# Patient Record
Sex: FEMALE | URBAN - METROPOLITAN AREA
[De-identification: names, ages, dates, MRNs, and addresses within clinical notes are randomized per-mention and may not be internally consistent; named-entity substitution may affect disease eponyms.]

---

## 2017-09-19 PROBLEM — D35.2 PROLACTINOMA (HCC): Chronic | Status: ACTIVE | Noted: 2017-09-19

## 2017-09-19 PROBLEM — E28.2 PCOS (POLYCYSTIC OVARIAN SYNDROME): Chronic | Status: ACTIVE | Noted: 2017-09-19

## 2018-06-18 PROBLEM — M79.606 LEG PAIN: Status: ACTIVE | Noted: 2018-06-18

## 2021-09-17 PROCEDURE — 88305 TISSUE EXAM BY PATHOLOGIST: CPT | Mod: TC,ORL | Performed by: NURSE PRACTITIONER

## 2021-09-17 PROCEDURE — 88305 TISSUE EXAM BY PATHOLOGIST: CPT | Mod: 26 | Performed by: DERMATOLOGY

## 2021-09-20 ENCOUNTER — LAB REQUISITION (OUTPATIENT)
Dept: LAB | Facility: CLINIC | Age: 31
End: 2021-09-20
Payer: COMMERCIAL

## 2021-09-20 DIAGNOSIS — D48.5 NEOPLASM OF UNCERTAIN BEHAVIOR OF SKIN: ICD-10-CM

## 2021-09-22 LAB
PATH REPORT.COMMENTS IMP SPEC: NORMAL
PATH REPORT.FINAL DX SPEC: NORMAL
PATH REPORT.GROSS SPEC: NORMAL
PATH REPORT.MICROSCOPIC SPEC OTHER STN: NORMAL
PATH REPORT.RELEVANT HX SPEC: NORMAL

## 2022-03-18 PROBLEM — M79.606 LEG PAIN: Status: ACTIVE | Noted: 2018-06-18

## 2022-03-19 PROBLEM — E28.2 PCOS (POLYCYSTIC OVARIAN SYNDROME): Status: ACTIVE | Noted: 2017-09-19

## 2022-03-20 PROBLEM — D35.2 PROLACTINOMA (HCC): Status: ACTIVE | Noted: 2017-09-19

## 2023-02-09 LAB — PAP SMEAR, EXTERNAL: NEGATIVE

## 2024-02-07 ENCOUNTER — HOSPITAL ENCOUNTER (OUTPATIENT)
Dept: PHYSICAL THERAPY | Age: 34
Setting detail: RECURRING SERIES
Discharge: HOME | End: 2024-02-10
Payer: COMMERCIAL

## 2024-02-07 DIAGNOSIS — M79.10 MYALGIA: ICD-10-CM

## 2024-02-07 DIAGNOSIS — R27.8 OTHER LACK OF COORDINATION: Primary | ICD-10-CM

## 2024-02-07 PROCEDURE — 97530 THERAPEUTIC ACTIVITIES: CPT

## 2024-02-07 PROCEDURE — 97110 THERAPEUTIC EXERCISES: CPT

## 2024-02-07 PROCEDURE — 97162 PT EVAL MOD COMPLEX 30 MIN: CPT

## 2024-02-07 NOTE — PROGRESS NOTES
Kelly Paris  : 1990  Primary: [facility] (Commercial)  Secondary:  [facility]ly [facility] @ [address] DELGADO Sheng  [place] [address]  Phone: [phone]  Fax: [phone] Plan Frequency: 1x/week  Plan of Care/Certification Expiration Date: 24 (24)        Plan of Care/Certification Expiration Date:  Plan of Care/Certification Expiration Date: 24 (24)    Frequency/Duration: Plan Frequency: 1x/week      Time In/Out:   Time In: 1005  Time Out: 1115      PT Visit Info:         Visit Count:  1    OUTPATIENT PHYSICAL THERAPY:   Treatment Note 2024       Episode  (PFPT)               Treatment Diagnosis:    Other lack of coordination  Myalgia  Contributing Diagnosis:  Pelvic and perineal pain (R10.2) and Pelvic floor dysfunction in female (M62.98)  Medical/Referring Diagnosis:    Pelvic congestion syndrome          Referring Physician:  Ludy Wesley MD MD Orders:  PT Eval and Treat   Return MD Appt:     Date of Onset:  Onset Date: 22 (Approximate)     Allergies:   Amoxicillin-pot clavulanate  Restrictions/Precautions:   None      Interventions Planned (Treatment may consist of any combination of the following):     See Assessment Note    Subjective Comments:   See IE.  Initial Pain Level::    5  /10  Post Session Pain Level:       5 /10  Medications Last Reviewed:  2024  Updated Objective Findings:  See Evaluation Note from today  Treatment   THERAPEUTIC ACTIVITY: ( 25 minutes): Functional activity education regarding anatomy, pathology and role of pelvic floor muscle (PFM) function in relation to presenting symptoms and role of pelvic floor therapy in conservative treatment. and Instruction on coordinated pelvic floor and diaphragmatic breathing to improve kinesthetic awareness of pelvic muscle mobility and restore proper motor recruitment patterns with breathing, posture, and functional movement (e.g. appropriate

## 2024-02-07 NOTE — THERAPY EVALUATION
[] Left  []DNT     Breath assessment:  Observation: chest breathing dominant  Coordination of pelvic floor muscles with breath: paradoxical movement present  Co-contraction of PFM with transversus abdominis: able with cuing       ASSESSMENT   Initial Assessment: Kelly Paris presents with musculoskeletal limitations including pelvic floor muscle (PFM) tension, reduced PFM Range of Motion (ROM), increased tenderness to palpation of the PFM, reduced coordination and awareness of PFM, abdominal soft tissue restrictions, poor diaphragm excursion, poor abdominal strength, and decreased pelvic floor muscle (PFM) strength.  These limitations are impairing the patient's ability to properly coordinate perineal elevation and descent for normalized PFM function, contributing to urinary dysfunction and pelvic pain . As a result, she is limited in her/his ability to participate in household chores and physical activities such as walking, swimming, or other exercise.      Therapy Problem List: (Impacting functional limitations):    Increased Pain, Decreased Strength, Decreased ROM, Decreased Functional Mobility, Decreased Owen with Home Exercise Program, Decreased Posture, Decreased Body Mechanics, Decreased Activity Tolerance/Endurance*, and Decreased Coordination   Therapy Prognosis:   Good     Initial Assessment Complexity:   Moderate Complexity     PLAN   Effective Dates: 2/7/2024 TO Plan of Care/Certification Expiration Date: 05/07/24 (05/07/24)   05/07/24   Frequency/Duration: Plan Frequency: 1x/week     Interventions Planned (Treatment may consist of any combination of the following):    Home Exercise Program (HEP), Manual Therapy, Neuromuscular Re-education/Strengthening, Range of Motion (ROM), Therapeutic Activites, and Therapeutic Exercise/Strengthening     Goals: (Goals have been discussed and agreed upon with patient.)  Short-Term Functional Goals: Time Frame: 6 weeks  Pt will demonstrate I with basic PFM

## 2024-02-22 ENCOUNTER — HOSPITAL ENCOUNTER (OUTPATIENT)
Dept: PHYSICAL THERAPY | Age: 34
Setting detail: RECURRING SERIES
Discharge: HOME | End: 2024-02-25
Payer: COMMERCIAL

## 2024-02-22 PROCEDURE — 97530 THERAPEUTIC ACTIVITIES: CPT

## 2024-02-22 PROCEDURE — 97110 THERAPEUTIC EXERCISES: CPT

## 2024-02-22 PROCEDURE — 97140 MANUAL THERAPY 1/> REGIONS: CPT

## 2024-02-22 NOTE — PROGRESS NOTES
Date:     Activity/Exercise Parameters Parameters Parameters   PFM range of motion/relaxation  - happy baby  - german pose  - DB 2x/day, LE's elevated on wedge 10 min Reviewed DB 2x/day    Supported CP with ball walk out  X 10 * HEP    Seated hip ER -   With support using ex ball  3 x 30 sec  *HEP    Thoracic rotation (side-lying)  X 10  *HEP    Bridge + hip abduction  10 x 10, BTB   *HEP    Side-lying hip abduction against wall  X 20  *HEP         MANUAL THERAPY: (15 minutes):   Soft tissue mobilization was utilized and necessary because of the patient's restricted motion of soft tissue.   Date Type Location Comments   2/22/2024 Internal assessment/treatment Via vaginal canal SP x 30 sec holds, paired with DB applied to iliococcygeus and OI, B     Pelvic diaphragm release SP paired with DB                                 (Used abbreviations: MET - muscle energy technique; SCS- Strain counter strain; CTM-Connective tissue mobilizations; CR- Contract/Relax; SP- Sustained pressure; PIT- Positional inhibition techniques; STM Soft -tissue mobilization; MM- Myofascial mobilization; TrP-Trigger point release; IASTM- Instrument assisted soft tissue mobilizations, TDN-Trigger point dry needling)    Pt gives verbal consent to internal vaginal assessment/treatment without chaperon present.     Treatment/Session Summary:    Treatment Assessment:   Pt showing improved coordination of PFM with her diaphragm. Pt with best lengthening/relaxation at end range of exhalation. She will continue to benefit from manual and verbal cues to facilitate diaphragmatic excursion. Responded best to manual techniques utilizing SP versus glides.   Communication/Consultation:    Equipment provided today:  HEP  Recommendations/Intent for next treatment session: Next visit will focus on PFM down-training.    >Total Treatment Billable Duration:  60 minutes  Time In: 0905  Time Out: 100    Maritza Barahona, PT         Charge Capture  [employer] Portal  Appt

## 2024-02-23 SDOH — HEALTH STABILITY: PHYSICAL HEALTH: ON AVERAGE, HOW MANY MINUTES DO YOU ENGAGE IN EXERCISE AT THIS LEVEL?: 40 MIN

## 2024-02-23 SDOH — HEALTH STABILITY: PHYSICAL HEALTH: ON AVERAGE, HOW MANY DAYS PER WEEK DO YOU ENGAGE IN MODERATE TO STRENUOUS EXERCISE (LIKE A BRISK WALK)?: 6 DAYS

## 2024-02-26 ENCOUNTER — OFFICE VISIT (OUTPATIENT)
Dept: FAMILY MEDICINE CLINIC | Facility: CLINIC | Age: 34
End: 2024-02-26
Payer: COMMERCIAL

## 2024-02-26 VITALS
OXYGEN SATURATION: 100 % | WEIGHT: 159.6 LBS | BODY MASS INDEX: 24.19 KG/M2 | HEART RATE: 71 BPM | HEIGHT: 68 IN | DIASTOLIC BLOOD PRESSURE: 74 MMHG | SYSTOLIC BLOOD PRESSURE: 116 MMHG

## 2024-02-26 DIAGNOSIS — D35.2 PROLACTINOMA (HCC): Primary | ICD-10-CM

## 2024-02-26 DIAGNOSIS — K21.9 GASTROESOPHAGEAL REFLUX DISEASE, UNSPECIFIED WHETHER ESOPHAGITIS PRESENT: ICD-10-CM

## 2024-02-26 DIAGNOSIS — R76.8 POSITIVE ANA (ANTINUCLEAR ANTIBODY): ICD-10-CM

## 2024-02-26 DIAGNOSIS — G44.89 OTHER HEADACHE SYNDROME: ICD-10-CM

## 2024-02-26 PROBLEM — I87.1 MAY-THURNER SYNDROME: Status: ACTIVE | Noted: 2022-03-29

## 2024-02-26 PROBLEM — E23.6 PITUITARY CYST (HCC): Status: ACTIVE | Noted: 2020-03-02

## 2024-02-26 PROBLEM — L68.0 HIRSUTISM: Status: ACTIVE | Noted: 2018-08-06

## 2024-02-26 PROBLEM — I87.1 NUTCRACKER PHENOMENON OF RENAL VEIN: Status: ACTIVE | Noted: 2022-03-29

## 2024-02-26 PROBLEM — Z87.42 HISTORY OF PCOS: Status: ACTIVE | Noted: 2018-08-06

## 2024-02-26 PROBLEM — R10.2 PELVIC PAIN IN FEMALE: Status: ACTIVE | Noted: 2021-11-19

## 2024-02-26 PROCEDURE — G8420 CALC BMI NORM PARAMETERS: HCPCS | Performed by: FAMILY MEDICINE

## 2024-02-26 PROCEDURE — 1036F TOBACCO NON-USER: CPT | Performed by: FAMILY MEDICINE

## 2024-02-26 PROCEDURE — G8427 DOCREV CUR MEDS BY ELIG CLIN: HCPCS | Performed by: FAMILY MEDICINE

## 2024-02-26 PROCEDURE — G8484 FLU IMMUNIZE NO ADMIN: HCPCS | Performed by: FAMILY MEDICINE

## 2024-02-26 PROCEDURE — 99203 OFFICE O/P NEW LOW 30 MIN: CPT | Performed by: FAMILY MEDICINE

## 2024-02-26 SDOH — ECONOMIC STABILITY: HOUSING INSECURITY
IN THE LAST 12 MONTHS, WAS THERE A TIME WHEN YOU DID NOT HAVE A STEADY PLACE TO SLEEP OR SLEPT IN A SHELTER (INCLUDING NOW)?: NO

## 2024-02-26 SDOH — ECONOMIC STABILITY: FOOD INSECURITY: WITHIN THE PAST 12 MONTHS, YOU WORRIED THAT YOUR FOOD WOULD RUN OUT BEFORE YOU GOT MONEY TO BUY MORE.: NEVER TRUE

## 2024-02-26 SDOH — ECONOMIC STABILITY: FOOD INSECURITY: WITHIN THE PAST 12 MONTHS, THE FOOD YOU BOUGHT JUST DIDN'T LAST AND YOU DIDN'T HAVE MONEY TO GET MORE.: NEVER TRUE

## 2024-02-26 SDOH — ECONOMIC STABILITY: INCOME INSECURITY: HOW HARD IS IT FOR YOU TO PAY FOR THE VERY BASICS LIKE FOOD, HOUSING, MEDICAL CARE, AND HEATING?: NOT HARD AT ALL

## 2024-02-26 ASSESSMENT — PATIENT HEALTH QUESTIONNAIRE - PHQ9
1. LITTLE INTEREST OR PLEASURE IN DOING THINGS: 0
SUM OF ALL RESPONSES TO PHQ QUESTIONS 1-9: 0
2. FEELING DOWN, DEPRESSED OR HOPELESS: 0
SUM OF ALL RESPONSES TO PHQ QUESTIONS 1-9: 0
SUM OF ALL RESPONSES TO PHQ QUESTIONS 1-9: 0
SUM OF ALL RESPONSES TO PHQ9 QUESTIONS 1 & 2: 0
SUM OF ALL RESPONSES TO PHQ QUESTIONS 1-9: 0

## 2024-02-26 ASSESSMENT — ENCOUNTER SYMPTOMS: RESPIRATORY NEGATIVE: 1

## 2024-02-26 NOTE — PROGRESS NOTES
PROGRESS NOTE    SUBJECTIVE:   Kelly Paris is a 34 y.o. female seen for a follow up visit regarding   Chief Complaint   Patient presents with    New Patient     Establish care        HPI:  Hx of GERD  Hx of esophageal stric  Does not see a GI  New patient, here to establish care.  She has a history of prolactinoma, diagnosed in 2014, at that time she had a 4 mm lesion noted on her pituitary gland.  Will start her on cabergoline, which was interrupted during her pregnancy and breast-feeding.  Resumed taking this after breast-feeding discontinued.  Follow-up MRI 2018 showed no evidence of a microadenoma.  She continues to take cabergoline twice a week.  She has not seen endocrinologist here.  She reports history of positive ZHANG, was seen rheumatology here and renal, apparently there has been some suspicion of scleroderma.  He has had some salivary issues, 1 time she has some difficulty swallowing, had to have esophageal dilation, however she does have a history of GERD.  She does not have any other intervention, presently no issues with swallowing.  Most recent ZHANG that I can see was 1:320.           Reviewed and updated this visit by provider:           Review of Systems   Respiratory: Negative.     Cardiovascular: Negative.           OBJECTIVE:  Vitals:    02/26/24 1023   BP: 116/74   Site: Left Upper Arm   Cuff Size: Small Adult   Pulse: 71   SpO2: 100%   Weight: 72.4 kg (159 lb 9.6 oz)   Height: 1.721 m (5' 7.75\")        Physical Exam   General: Alert and oriented x3, well-appearing  Neck: No adenopathy, thyromegaly or thyroid nodules  Pulmonary: Normal effort, good airflow, no rales or rhonchi  CVS: Regular rate and rhythm, normal S1, S2, no S3 or S4, no murmurs; no carotid bruits, 2+ pedal pulses      Medical problems and test results were reviewed with the patient today.     No results found for this or any previous visit (from the past 672 hour(s)).    No results found for any visits on 02/26/24.

## 2024-02-28 ENCOUNTER — HOSPITAL ENCOUNTER (OUTPATIENT)
Dept: PHYSICAL THERAPY | Age: 34
Setting detail: RECURRING SERIES
Discharge: HOME | End: 2024-03-02
Payer: COMMERCIAL

## 2024-02-28 PROCEDURE — 97110 THERAPEUTIC EXERCISES: CPT

## 2024-02-28 PROCEDURE — 97530 THERAPEUTIC ACTIVITIES: CPT

## 2024-02-28 PROCEDURE — 97140 MANUAL THERAPY 1/> REGIONS: CPT

## 2024-02-28 NOTE — PROGRESS NOTES
is showing improved coordination of DB, but descent remains limited. Excessive lumbar extension noted with completion of quadruped hip extension and UE flexion.   Communication/Consultation:    Equipment provided today:  HEP  Recommendations/Intent for next treatment session: Next visit will focus on PFM down-training and coordination training, global core and hip stabilization    >Total Treatment Billable Duration:  46 minutes  Time In: 0910  Time Out: 1000    Maritza Barahona PT         Charge Capture  [employer] Portal  Appt Desk     Future Appointments   Date Time Provider Department Center   3/6/2024 10:00 AM Maritza Barahona PT [facility]   3/12/2024 10:00 AM Maritza Barahona PT [facility]   3/25/2024 10:00 AM Maritza Barahona PT [facility]   4/1/2024 10:00 AM Maritza Barahona PT [facility]

## 2024-03-06 ENCOUNTER — HOSPITAL ENCOUNTER (OUTPATIENT)
Dept: PHYSICAL THERAPY | Age: 34
Setting detail: RECURRING SERIES
End: 2024-03-06
Payer: COMMERCIAL

## 2024-03-06 NOTE — PROGRESS NOTES
Kelly Paris  : 1990  Primary: [facility]  Secondary:  [facility] @ [address] DELGADO Sheng  [facility] [address]  Phone: [phone]  Fax: [phone]    PT Visit Info:    No data recorded   OT Visit Info:  No data recorded    OUTPATIENT THERAPY: 3/6/2024  Episode  Appt Desk        Kelly Paris cancelled her appointment for today due to unknown reasons.  Will plan to follow up next during next appointment.  Thank you,  Maritza Barahona, PT    Future Appointments   Date Time Provider Department Center   3/6/2024  7:00 PM Maritza Barahona, PT [facility]   3/12/2024 10:00 AM Maritza Barahona, PT [facility]   3/25/2024 10:00 AM Maritza Barahona, PT [facility]   2024 10:00 AM Maritza Barahona, PT [facility]   2024 10:00 AM Umang Valverde MD [facility]   2024 10:00 AM Royal Hartley DO [facility]

## 2024-03-12 ENCOUNTER — HOSPITAL ENCOUNTER (OUTPATIENT)
Dept: PHYSICAL THERAPY | Age: 34
Setting detail: RECURRING SERIES
Discharge: HOME | End: 2024-03-15
Payer: COMMERCIAL

## 2024-03-12 PROCEDURE — 97110 THERAPEUTIC EXERCISES: CPT

## 2024-03-12 NOTE — PROGRESS NOTES
Kelly Paris  : 1990  Primary: [facility] (Commercial)  Secondary:  [facility] @ [address] [place] [place] [address]  Phone: [phone]  Fax: [phone] Plan Frequency: 1x/week  Plan of Care/Certification Expiration Date: 24 (24)        Plan of Care/Certification Expiration Date:  Plan of Care/Certification Expiration Date: 24 (24)    Frequency/Duration: Plan Frequency: 1x/week      Time In/Out:   Time In: 1005  Time Out: 1100      PT Visit Info:         Visit Count:  4    OUTPATIENT PHYSICAL THERAPY:   Treatment Note 3/12/2024       Episode  (PFPT)               Treatment Diagnosis:    Other lack of coordination  Myalgia  Contributing Diagnosis:  Pelvic and perineal pain (R10.2) and Pelvic floor dysfunction in female (M62.98)  Medical/Referring Diagnosis:    Pelvic congestion syndrome      Referring Physician:  Ludy Wesley MD MD Orders:  PT Eval and Treat   Return MD Appt:     Date of Onset:  Onset Date: 22 (Approximate)     Allergies:   Amoxicillin-pot clavulanate  Restrictions/Precautions:   None      Interventions Planned (Treatment may consist of any combination of the following):     See Assessment Note    Subjective Comments:   3/12/24:  Pt currently on her menses. She defers internal treatment.   Pt states she not been as consistent with her exercises due to cervical pain. She saw chiropractic care and has had relief in her cervical spine.   Pt reports her pelvic pain has been less. Cramping has been well managed currently. Typically she reports greater discomfort at the end of her cycle.   Reduced discomfort during sexual intercourse.   Urinary urge sensations have not been painful.     24:  No cramping following previous session. Overall, pelvic pain has been well managed in the last week.   Feels it has been better.     24:  Pt went on vacation to [place] and did well in regards to

## 2024-03-25 ENCOUNTER — HOSPITAL ENCOUNTER (OUTPATIENT)
Dept: PHYSICAL THERAPY | Age: 34
Setting detail: RECURRING SERIES
Discharge: HOME | End: 2024-03-28
Payer: COMMERCIAL

## 2024-03-25 PROCEDURE — 97110 THERAPEUTIC EXERCISES: CPT

## 2024-03-25 PROCEDURE — 97140 MANUAL THERAPY 1/> REGIONS: CPT

## 2024-03-25 ASSESSMENT — PAIN SCALES - GENERAL: PAINLEVEL_OUTOF10: 6

## 2024-03-25 NOTE — PROGRESS NOTES
Kelly Paris  : 1990  Primary: [facility] (Commercial)  Secondary:  [facility] @ [address] [place]GAD[place] Sheng  [facility] [address]  Phone: [phone]  Fax: [phone] Plan Frequency: 1x/week  Plan of Care/Certification Expiration Date: 24 (24)        Plan of Care/Certification Expiration Date:  Plan of Care/Certification Expiration Date: 24 (24)    Frequency/Duration: Plan Frequency: 1x/week      Time In/Out:   Time In: 1010  Time Out: 1115      PT Visit Info:         Visit Count:  5    OUTPATIENT PHYSICAL THERAPY:   Treatment Note 3/25/2024       Episode  (PFPT)               Treatment Diagnosis:    Other lack of coordination  Myalgia  Contributing Diagnosis:  Pelvic and perineal pain (R10.2) and Pelvic floor dysfunction in female (M62.98)  Medical/Referring Diagnosis:    Pelvic congestion syndrome      Referring Physician:  Ludy Wesley MD MD Orders:  PT Eval and Treat   Return MD Appt:     Date of Onset:  Onset Date: 22 (Approximate)     Allergies:   Amoxicillin-pot clavulanate  Restrictions/Precautions:   None      Interventions Planned (Treatment may consist of any combination of the following):     See Assessment Note    Subjective Comments:   3/25/24:  Pt has been completed her exercises without resistance bands.   Pt feels bridges may be causing more pain in her pelvic muscles.   Pt holding bridges for longer period of time typically. >10 seconds.   No pain with additional exercises.   Increased pain following picking up sticks. Pain is described as deep and internal.   Denies LBP.   Denies abdominal cramping.   No difficulty with bowel/bladder.     3/12/24:  Pt currently on her menses. She defers internal treatment.   Pt states she not been as consistent with her exercises due to cervical pain. She saw chiropractic care and has had relief in her cervical spine.   Pt reports her pelvic pain has been less.

## 2024-04-01 ENCOUNTER — APPOINTMENT (OUTPATIENT)
Dept: PHYSICAL THERAPY | Age: 34
End: 2024-04-01
Payer: COMMERCIAL

## 2024-04-09 ENCOUNTER — OFFICE VISIT (OUTPATIENT)
Dept: ENDOCRINOLOGY | Age: 34
End: 2024-04-09
Payer: COMMERCIAL

## 2024-04-09 VITALS
OXYGEN SATURATION: 97 % | BODY MASS INDEX: 24.05 KG/M2 | WEIGHT: 157 LBS | HEART RATE: 73 BPM | DIASTOLIC BLOOD PRESSURE: 75 MMHG | SYSTOLIC BLOOD PRESSURE: 115 MMHG

## 2024-04-09 DIAGNOSIS — D35.2 PROLACTINOMA (HCC): Primary | ICD-10-CM

## 2024-04-09 DIAGNOSIS — E28.2 POLYCYSTIC OVARIAN SYNDROME: ICD-10-CM

## 2024-04-09 DIAGNOSIS — D35.2 PROLACTINOMA (HCC): ICD-10-CM

## 2024-04-09 LAB — PROLACTIN SERPL-MCNC: 30.7 NG/ML

## 2024-04-09 PROCEDURE — G8420 CALC BMI NORM PARAMETERS: HCPCS | Performed by: INTERNAL MEDICINE

## 2024-04-09 PROCEDURE — 99204 OFFICE O/P NEW MOD 45 MIN: CPT | Performed by: INTERNAL MEDICINE

## 2024-04-09 PROCEDURE — 1036F TOBACCO NON-USER: CPT | Performed by: INTERNAL MEDICINE

## 2024-04-09 PROCEDURE — G8427 DOCREV CUR MEDS BY ELIG CLIN: HCPCS | Performed by: INTERNAL MEDICINE

## 2024-04-09 RX ORDER — CABERGOLINE 0.5 MG/1
0.25 TABLET ORAL
Qty: 4 TABLET | Refills: 1
Start: 2024-04-11 | End: 2024-04-10 | Stop reason: SDUPTHER

## 2024-04-09 ASSESSMENT — ENCOUNTER SYMPTOMS
CONSTIPATION: 0
DIARRHEA: 0

## 2024-04-10 DIAGNOSIS — D35.2 PROLACTINOMA (HCC): ICD-10-CM

## 2024-04-10 RX ORDER — CABERGOLINE 0.5 MG/1
0.25 TABLET ORAL WEEKLY
Qty: 2 TABLET | Refills: 5 | Status: SHIPPED | OUTPATIENT
Start: 2024-04-10

## 2024-04-10 NOTE — PROGRESS NOTES
My Chart message to patient:    Thank you for checking labs.  Your prolactin level is approximately 30, which is not as high as it has been in the past.  Go ahead and reduce your cabergoline dose to half of a pill once a week rather than twice a week.  I sent the prescription to your local pharmacy on file.  If you have any return of breast milk production, or if your menstrual cycles stop, please let me know.  Otherwise, just recheck labs a couple of days before the next appointment with me.

## 2024-04-12 ENCOUNTER — HOSPITAL ENCOUNTER (OUTPATIENT)
Dept: PHYSICAL THERAPY | Age: 34
Setting detail: RECURRING SERIES
Discharge: HOME | End: 2024-04-15
Payer: COMMERCIAL

## 2024-04-12 PROCEDURE — 97140 MANUAL THERAPY 1/> REGIONS: CPT

## 2024-04-12 PROCEDURE — 97110 THERAPEUTIC EXERCISES: CPT

## 2024-04-12 NOTE — PROGRESS NOTES
Date:  4/12/24:   Activity/Exercise Parameters Parameters Parameters   PFM range of motion/relaxation  - happy baby  - german pose  - DB 2x/day, LE's elevated on wedge      Supported CP with ball walk out CP x 2 min     Seated hip ER -  Seated w/out exercise ball 3 x 30 sec     Thoracic rotation (side-lying) held     Bridge + hip abduction 10 x 10 BTB X 10 x 10 BTB    Side-lying hip abduction against wall 3 x 10, YTB     Bird dog X 10     X 5 sec UE flexion, LE extension, x 5 sec shoulder abd, hip abd     Thoracic mobility      Management of cervical pain      Tall kneeling - pectoral and hip flexor stretch in doorway 4 x 30 sec Reviewed positioning    Lateral lunge X 10 - emphasis on adductor mobility  X 5 - emphasis on adductor mobility   *reviewed mechanics and form Held   HEP review and update  5 min Reviewed and updated Thorough review of HEP   Reverse lunge   X 5, B, mirror for visual cues   Squat   X 10, B, mirror for visual cues  To chair   Hip ER mobility options   Supine, against wall, seated   Variations of HS stretch   Seated, standing, against wall      Access Code: [email or web address]  URL: [email or web address]  Date: 03/12/2024  Prepared by: Maritza Barahona    Exercises  - Sidelying Hip Abduction with Resistance at Thighs  - 1 x daily - 3 x weekly - 3 sets - 10 reps  - Bridge with Hip Abduction and Resistance  - 1 x daily - 3 x weekly - 3 sets - 10 reps  - Bird Dog  - 1 x daily - 3 x weekly - 2 sets - 10 reps  - Doorway Kneeling Hip Flexor Stretch  - 1 x daily - 7 x weekly - 3 sets - 30 hold  - Lateral Lunge  - 1 x daily - 3 x weekly - 2 sets - 10 reps  - Supine Pelvic Floor Stretch - Hands on Knees  - 1 x daily - 7 x weekly - 3 sets - 30 hold  - Hip External Rotation Stretch  - 1 x daily - 7 x weekly - 3 sets - 30 hold      MANUAL THERAPY: (16 minutes):   Soft tissue mobilization was utilized and necessary because of the patient's restricted motion of soft tissue.   Date Type Location Comments

## 2024-05-03 ENCOUNTER — HOSPITAL ENCOUNTER (OUTPATIENT)
Dept: PHYSICAL THERAPY | Age: 34
Setting detail: RECURRING SERIES
End: 2024-05-03
Payer: COMMERCIAL

## 2024-05-03 NOTE — PROGRESS NOTES
Kelly Paris  : 1990  Primary: [facility]  Secondary:  [facility] @ [address]GAD[address] Sheng  [facility] [address]  Phone: [phone]  Fax: [phone]    PT Visit Info:    No data recorded   OT Visit Info:  No data recorded    OUTPATIENT THERAPY: 5/3/2024  Episode  Appt Desk        Kelly Paris cancelled her appointment for today due to unknown reasons.  Will plan to follow up next during next appointment.  Thank you,  Maritza Barahona PT    Future Appointments   Date Time Provider Department Center   5/3/2024  7:00 PM Maritza Barahona PT [facility]   2024 10:00 AM Royal Hartley DO [facility]   10/10/2024 10:45 AM Umang Valverde MD [facility]

## 2024-05-15 ENCOUNTER — HOSPITAL ENCOUNTER (OUTPATIENT)
Dept: PHYSICAL THERAPY | Age: 34
Setting detail: RECURRING SERIES
Discharge: HOME | End: 2024-05-18
Payer: COMMERCIAL

## 2024-05-15 PROCEDURE — 97110 THERAPEUTIC EXERCISES: CPT

## 2024-05-15 ASSESSMENT — PAIN SCALES - GENERAL: PAINLEVEL_OUTOF10: 0

## 2024-05-15 NOTE — THERAPY RECERTIFICATION
Kelly Paris  : 1990  Primary: [facility] (Commercial)  Secondary:  [facility] @ [address] Sheng  [facility] [address]  Phone: [phone]  Fax: [phone] Plan Frequency: bi-weekly    Plan of Care/Certification Expiration Date: 24 (24)        Plan of Care/Certification Expiration Date:  Plan of Care/Certification Expiration Date: 24 (24)    Frequency/Duration: Plan Frequency: bi-weekly      Time In/Out:   Time In: 1102  Time Out: 1159    PT Visit Info:    Plan Frequency: bi-weekly      Visit Count:  7                OUTPATIENT PHYSICAL THERAPY:             Recertification 5/15/2024                 Episode (PFPT)         Treatment Diagnosis:    Other lack of coordination  Myalgia  Contributing Diagnosis:  Pelvic and perineal pain (R10.2) and Pelvic floor dysfunction in female (M62.98)  Medical/Referring Diagnosis:    Pelvic congestion syndrome      Referring Physician:  Ludy Wesley MD MD Orders:  PT Eval and Treat   Return MD Appt:    Date of Onset:  Onset Date: 22 (Approximate)     Allergies:  Amoxicillin-pot clavulanate  Restrictions/Precautions:    None      Medications Last Reviewed:  5/15/2024     SUBJECTIVE   Subjective at Re-Certification/Re-Evaluation:  5/15/24:  She would like to just complete exercises today.   Pt describes pain is overall less. Her exercises are helping.   She had intercourse with her  and felt her pain was less.   Urinary: Denies issues.   Denies issues with BM's.   Pt states she took a couple weeks off of completing her exercises. During the past week she has completed her exercises daily.   Feels some knee pain with squats.   *Pt will see rheumatology on Friday.  PPD: 0    History of Injury/Illness (Reason for Referral):  Ms. Paris is a 35 yo female  referred to pelvic floor physical therapy (PFPT) by Ludy Wesley MD 2/ due to pelvic pain and  Pelvic

## 2024-05-15 NOTE — PROGRESS NOTES
Kelly Paris  : 1990  Primary: [facility] (Commercial)  Secondary:  [facility] @ [address]GADO Sheng  [facility] [address]  Phone: [phone]  Fax: [phone] Plan Frequency: 1x/week  Plan of Care/Certification Expiration Date: 24 (24)        Plan of Care/Certification Expiration Date:  Plan of Care/Certification Expiration Date: 24 (24)    Frequency/Duration: Plan Frequency: 1x/week      Time In/Out:   Time In: 1102  Time Out: 1159      PT Visit Info:         Visit Count:  7    OUTPATIENT PHYSICAL THERAPY:   Treatment Note 5/15/2024       Episode  (PFPT)               Treatment Diagnosis:    Other lack of coordination  Myalgia  Contributing Diagnosis:  Pelvic and perineal pain (R10.2) and Pelvic floor dysfunction in female (M62.98)  Medical/Referring Diagnosis:    Pelvic congestion syndrome      Referring Physician:  Ludy Wesley MD MD Orders:  PT Eval and Treat   Return MD Appt:     Date of Onset:  Onset Date: 22 (Approximate)     Allergies:   Amoxicillin-pot clavulanate  Restrictions/Precautions:   None      Interventions Planned (Treatment may consist of any combination of the following):     See Assessment Note    Subjective Comments:   5/15/24:  She would like to just complete exercises today.   Pt describes pain is overall less. Her exercises are helping.   She had intercourse with her  and felt her pain was less.   Urinary: Denies issues.   Denies issues with BM's.   Pt states she took a couple weeks off of completing her exercises. During the past week she has completed her exercises daily.   Feels some knee pain with squats.   *Pt will see rheumatology on Friday.    24:  Pt states overall she has been feeling good.   Pt continues to gain relief with headaches with chiropractic care.   Pt does not like using bad due to tension at knees. Placement of band is uncomfortable.   Pt feels her

## 2024-05-17 ENCOUNTER — OFFICE VISIT (OUTPATIENT)
Dept: RHEUMATOLOGY | Age: 34
End: 2024-05-17
Payer: COMMERCIAL

## 2024-05-17 VITALS
WEIGHT: 158 LBS | SYSTOLIC BLOOD PRESSURE: 133 MMHG | HEIGHT: 68 IN | TEMPERATURE: 98.1 F | DIASTOLIC BLOOD PRESSURE: 78 MMHG | BODY MASS INDEX: 23.95 KG/M2 | HEART RATE: 74 BPM

## 2024-05-17 DIAGNOSIS — M25.50 ARTHRALGIA, UNSPECIFIED JOINT: ICD-10-CM

## 2024-05-17 DIAGNOSIS — R76.8 POSITIVE ANA (ANTINUCLEAR ANTIBODY): Primary | ICD-10-CM

## 2024-05-17 PROCEDURE — 99215 OFFICE O/P EST HI 40 MIN: CPT | Performed by: INTERNAL MEDICINE

## 2024-05-17 PROCEDURE — G8420 CALC BMI NORM PARAMETERS: HCPCS | Performed by: INTERNAL MEDICINE

## 2024-05-17 PROCEDURE — 1036F TOBACCO NON-USER: CPT | Performed by: INTERNAL MEDICINE

## 2024-05-17 PROCEDURE — G8427 DOCREV CUR MEDS BY ELIG CLIN: HCPCS | Performed by: INTERNAL MEDICINE

## 2024-05-17 NOTE — PROGRESS NOTES
rotation.     No tenderness of the C spine and no tenderness of the para spinal muscles of the neck. No tenderness of the T and L spine. No SI joint tenderness. No mid joint line tenderness of the knees with no swelling, warmth or redness. No tenderness with synovitis in the ankles with no warmth or redness. No metatarsalgia with no synovitis of the 1st to 5th MTP joints of the feet with no warmth or redness.       Patient otherwise has a normal joint exam without other evidence of joint tenderness, synovitis, warmth, erythema, decreased ROM, weakness or deformities.       Radiology Reports Reviewed (if available):  Last 3 months  [unfilled]    Lab Reports Reviewed (if available): Last 3 months    No results found for this visit on 05/17/24.      The results above were reviewed and discussed with patient.        Assessment and plan:   Kelly Paris is a 34 y.o. female who presents with:     1. Positive ZHANG (antinuclear antibody)  Overview:  9/2023  +ZHANG 1:320 speckled  (-)dsdna, RNP, smith, scl-70, ssa, ssb, chromatin, otto-1, centromere b  Normal ESR, CMP, CBC    3/2022  ZHANG 1:80  (-) ccp, RF, dsdna, smith, rnp, chromatin, ssa, ssb, scl-70, otto-1  Normal ESR    Has raynauds, normal nail fold capillaries. Reports having burning and mild stiffness in the hands/feet, no synovitis on exam. No dry eyes/dry mouth. Discussed that the positive ZHANG might not be clinically significant. There is no other evidence of positive disease specific antibodies or clinical manifestations concerning for CTD or inflammatory arthritis. Patient requested lyme disease to be tested because of grass exposure although no tick bite or EM rash was noted, discussed this is unlikely contributing to symptoms but tested at patient's request.     Plan:  -check labs below  -check x-rays below  -recent labs done by Dr. Lawrence, patient's prior rheumatologist will be sent by the patient to review  -advised her to watch for symptoms of persistent

## 2024-05-20 PROBLEM — M25.50 ARTHRALGIA: Status: ACTIVE | Noted: 2024-05-20

## 2024-07-08 ENCOUNTER — HOSPITAL ENCOUNTER (OUTPATIENT)
Dept: GENERAL RADIOLOGY | Age: 34
Discharge: HOME | End: 2024-07-11
Payer: COMMERCIAL

## 2024-07-08 DIAGNOSIS — R76.8 POSITIVE ANA (ANTINUCLEAR ANTIBODY): ICD-10-CM

## 2024-07-08 DIAGNOSIS — M25.50 ARTHRALGIA, UNSPECIFIED JOINT: ICD-10-CM

## 2024-07-08 LAB
ALBUMIN SERPL-MCNC: 4.4 G/DL (ref 3.5–5)
ALBUMIN/GLOB SERPL: 1.7 (ref 1–1.9)
ALP SERPL-CCNC: 51 U/L (ref 35–104)
ALT SERPL-CCNC: 15 U/L (ref 12–65)
ANION GAP SERPL CALC-SCNC: 11 MMOL/L (ref 9–18)
APPEARANCE UR: CLEAR
AST SERPL-CCNC: 20 U/L (ref 15–37)
BASOPHILS # BLD: 0 K/UL (ref 0–0.2)
BASOPHILS NFR BLD: 1 % (ref 0–2)
BILIRUB SERPL-MCNC: 0.8 MG/DL (ref 0–1.2)
BILIRUB UR QL: NEGATIVE
BUN SERPL-MCNC: 14 MG/DL (ref 6–23)
CALCIUM SERPL-MCNC: 9.6 MG/DL (ref 8.8–10.2)
CHLORIDE SERPL-SCNC: 102 MMOL/L (ref 98–107)
CO2 SERPL-SCNC: 25 MMOL/L (ref 20–28)
COLOR UR: NORMAL
CREAT SERPL-MCNC: 0.74 MG/DL (ref 0.6–1.1)
CREAT UR-MCNC: 37.4 MG/DL (ref 28–217)
DIFFERENTIAL METHOD BLD: NORMAL
EOSINOPHIL # BLD: 0.1 K/UL (ref 0–0.8)
EOSINOPHIL NFR BLD: 2 % (ref 0.5–7.8)
ERYTHROCYTE [DISTWIDTH] IN BLOOD BY AUTOMATED COUNT: 13.3 % (ref 11.9–14.6)
ERYTHROCYTE [SEDIMENTATION RATE] IN BLOOD: <1 MM/HR (ref 0–20)
GLOBULIN SER CALC-MCNC: 2.6 G/DL (ref 2.3–3.5)
GLUCOSE SERPL-MCNC: 80 MG/DL (ref 70–99)
GLUCOSE UR STRIP.AUTO-MCNC: NEGATIVE MG/DL
HCT VFR BLD AUTO: 40.7 % (ref 35.8–46.3)
HGB BLD-MCNC: 12.9 G/DL (ref 11.7–15.4)
HGB UR QL STRIP: NEGATIVE
IMM GRANULOCYTES # BLD AUTO: 0 K/UL (ref 0–0.5)
IMM GRANULOCYTES NFR BLD AUTO: 0 % (ref 0–5)
KETONES UR QL STRIP.AUTO: NEGATIVE MG/DL
LEUKOCYTE ESTERASE UR QL STRIP.AUTO: NEGATIVE
LYMPHOCYTES # BLD: 1.5 K/UL (ref 0.5–4.6)
LYMPHOCYTES NFR BLD: 27 % (ref 13–44)
MCH RBC QN AUTO: 29.6 PG (ref 26.1–32.9)
MCHC RBC AUTO-ENTMCNC: 31.7 G/DL (ref 31.4–35)
MCV RBC AUTO: 93.3 FL (ref 82–102)
MONOCYTES # BLD: 0.6 K/UL (ref 0.1–1.3)
MONOCYTES NFR BLD: 10 % (ref 4–12)
NEUTS SEG # BLD: 3.5 K/UL (ref 1.7–8.2)
NEUTS SEG NFR BLD: 60 % (ref 43–78)
NITRITE UR QL STRIP.AUTO: NEGATIVE
NRBC # BLD: 0 K/UL (ref 0–0.2)
PH UR STRIP: 7 (ref 5–9)
PLATELET # BLD AUTO: 259 K/UL (ref 150–450)
PMV BLD AUTO: 10.4 FL (ref 9.4–12.3)
POTASSIUM SERPL-SCNC: 4 MMOL/L (ref 3.5–5.1)
PROT SERPL-MCNC: 6.9 G/DL (ref 6.3–8.2)
PROT UR STRIP-MCNC: NEGATIVE MG/DL
PROT UR-MCNC: <6 MG/DL
PROT/CREAT UR-RTO: NORMAL
RBC # BLD AUTO: 4.36 M/UL (ref 4.05–5.2)
SODIUM SERPL-SCNC: 138 MMOL/L (ref 136–145)
SP GR UR REFRACTOMETRY: 1.01 (ref 1–1.02)
UROBILINOGEN UR QL STRIP.AUTO: 0.2 EU/DL (ref 0.2–1)
WBC # BLD AUTO: 5.8 K/UL (ref 4.3–11.1)

## 2024-07-08 PROCEDURE — 73630 X-RAY EXAM OF FOOT: CPT

## 2024-07-08 PROCEDURE — 73130 X-RAY EXAM OF HAND: CPT

## 2024-07-09 LAB
C3 SERPL-MCNC: 109 MG/DL (ref 82–167)
C4 SERPL-MCNC: 17 MG/DL (ref 12–38)
CRP SERPL-MCNC: <1 MG/L (ref 0–10)
DSDNA CRITHIDIA LUCILIAE: NEGATIVE
LYME ANTIBODY: NEGATIVE

## 2024-07-10 LAB — THYROPEROXIDASE AB SERPL-ACNC: <9 IU/ML (ref 0–34)

## 2024-08-05 ENCOUNTER — OFFICE VISIT (OUTPATIENT)
Dept: RHEUMATOLOGY | Age: 34
End: 2024-08-05
Payer: COMMERCIAL

## 2024-08-05 VITALS
WEIGHT: 159 LBS | DIASTOLIC BLOOD PRESSURE: 75 MMHG | HEART RATE: 70 BPM | BODY MASS INDEX: 24.1 KG/M2 | TEMPERATURE: 97.9 F | HEIGHT: 68 IN | SYSTOLIC BLOOD PRESSURE: 124 MMHG

## 2024-08-05 DIAGNOSIS — R76.8 SS-B ANTIBODY POSITIVE: ICD-10-CM

## 2024-08-05 DIAGNOSIS — M25.50 ARTHRALGIA, UNSPECIFIED JOINT: ICD-10-CM

## 2024-08-05 DIAGNOSIS — R76.8 POSITIVE ANA (ANTINUCLEAR ANTIBODY): Primary | ICD-10-CM

## 2024-08-05 PROCEDURE — 1036F TOBACCO NON-USER: CPT | Performed by: INTERNAL MEDICINE

## 2024-08-05 PROCEDURE — G8427 DOCREV CUR MEDS BY ELIG CLIN: HCPCS | Performed by: INTERNAL MEDICINE

## 2024-08-05 PROCEDURE — G8420 CALC BMI NORM PARAMETERS: HCPCS | Performed by: INTERNAL MEDICINE

## 2024-08-05 PROCEDURE — 99214 OFFICE O/P EST MOD 30 MIN: CPT | Performed by: INTERNAL MEDICINE

## 2024-08-05 ASSESSMENT — ROUTINE ASSESSMENT OF PATIENT INDEX DATA (RAPID3)
ON A SCALE OF ONE TO TEN, HOW MUCH OF A PROBLEM HAS UNUSUAL FATIGUE OR TIREDNESS BEEN FOR YOU OVER THE PAST WEEK?: 0
ON A SCALE OF ONE TO TEN, CONSIDERING ALL THE WAYS IN WHICH ILLNESS AND HEALTH CONDITIONS MAY AFFECT YOU AT THIS TIME, PLEASE INDICATE BELOW HOW YOU ARE DOING:: 0
ON A SCALE OF ONE TO TEN, HOW DIFFICULT WAS IT FOR YOU TO COMPLETE THE LISTED DAILY PHYSICAL TASKS OVER THE LAST WEEK: 0
ON A SCALE OF ONE TO TEN, HOW MUCH PAIN HAVE YOU HAD BECAUSE OF YOUR CONDITION OVER THE PAST WEEK?: 0

## 2024-08-05 NOTE — PROGRESS NOTES
[facility]  Royal Hartley D.O.  [address], [address] , [address]   [place]  Office : [phone], Fax: [phone]     RHEUMATOLOGY OFFICE VISIT NOTE  Date of Visit:  2024 11:01 AM    Patient Information:  Name:  Kelly Paris  :  1990  Age:  34 y.o.   Gender:  female      Ms. Paris is here today for follow-up of  +ZHANG 1:320 speckled, equivocal SSB  Polyarticular arthralgias    Last visit: 2024 seen In consult for above. [place] ZHANG may not be clinically significant.     Further workup:  2024  (-) dsdna crithidia, tpo ab, lyme ab  Normal CBC, CMP, UA, UPCR, ESR, CRP, C3, C4      History of Present Illness:    Since the last visit, patient is feeling \"very good\".      Pain level: 0 /10    Reviewed prior lab off patient's phone  2024  SSB 7.6 equivocal     Dry mouth - resolved  Raynauds - pale (not starkly obvious pallor) and red with cold exposure, hands are cold.   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 and overdoing it  Mouth sores -not getting these right now but sometimes gets sore in the nose and around when she gets her cycle, they feel tender for a few days         2024    10:00 AM   DMARD/Biologic   AM Stiffness None   Pain 0   Fatigue 0   MDHAQ 0   Patient Global Score 0         REVIEW OF SYSTEMS: The following systems were reviewed with patient today and were negative except for the following (depicted with an \"X\"):        \"X\" General  \"X\" Head and Neck  \"X\" Heart and Breathing  \"X\" Gastrointestinal    Fever/chills   Hair loss   Shortness of breath   Upset stomach    Falls   Dry mouth   Coughing   Diarrhea / constipation    Wt loss   Mouth sores   Wheezing  x Heartburn    Wt gain   Ringing ears   Chest pain   Dark or bloody stools    Night sweats   Diff. swallowing  X None of above   Nausea or vomiting   X None of above  X None of above      None of above                \"X\"

## 2024-08-05 NOTE — PATIENT INSTRUCTIONS
Get labs done on 8/1/2025  F/u in 1 year  Please notify me for any joint swelling/stiffness/restricted ROM, dry eyes, dry mouth, rashes that develop

## 2024-10-09 DIAGNOSIS — D35.2 PROLACTINOMA (HCC): ICD-10-CM

## 2024-10-09 LAB — PROLACTIN SERPL-MCNC: 87.4 NG/ML (ref 4.8–23.3)

## 2024-10-10 ENCOUNTER — OFFICE VISIT (OUTPATIENT)
Dept: ENDOCRINOLOGY | Age: 34
End: 2024-10-10
Payer: COMMERCIAL

## 2024-10-10 DIAGNOSIS — D35.2 PROLACTINOMA (HCC): Primary | ICD-10-CM

## 2024-10-10 PROCEDURE — G8420 CALC BMI NORM PARAMETERS: HCPCS | Performed by: INTERNAL MEDICINE

## 2024-10-10 PROCEDURE — G8428 CUR MEDS NOT DOCUMENT: HCPCS | Performed by: INTERNAL MEDICINE

## 2024-10-10 PROCEDURE — 1036F TOBACCO NON-USER: CPT | Performed by: INTERNAL MEDICINE

## 2024-10-10 PROCEDURE — G8484 FLU IMMUNIZE NO ADMIN: HCPCS | Performed by: INTERNAL MEDICINE

## 2024-10-10 PROCEDURE — 99213 OFFICE O/P EST LOW 20 MIN: CPT | Performed by: INTERNAL MEDICINE

## 2024-10-10 RX ORDER — CABERGOLINE 0.5 MG/1
0.25 TABLET ORAL WEEKLY
Qty: 6 TABLET | Refills: 3 | Status: SHIPPED | OUTPATIENT
Start: 2024-10-10

## 2024-10-10 ASSESSMENT — ENCOUNTER SYMPTOMS
DIARRHEA: 0
CONSTIPATION: 0

## 2024-10-10 NOTE — PROGRESS NOTES
TSH 2.190.  2020: Prolactin 22.6.  2020: Prolactin 46.8.  2021: Prolactin 32.3.  10/14/2021: Prolactin 33.6.  2021: TSH 2.462.  2023: Prolactin 66.0.  2023: Prolactin 76.1.  2023: Prolactin 69.1, TSH 1.630, free T4 1.08.  2024: Prolactin 30.7.  2024: Antithyroid peroxidase less than 9 (-).  10/9/2024: Prolactin 87.4.    Imagin2017: MRI pituitary ([facility])- Mild asymmetry of the pituitary gland with no definite adenoma.    2018: MRI pituitary ([facility])- Normal pituitary and sella turcica.      Review of Systems   Constitutional:  Positive for unexpected weight change (significant fluctuations). Negative for fatigue.   Eyes:  Negative for visual disturbance.   Cardiovascular:  Negative for palpitations.   Gastrointestinal:  Negative for constipation and diarrhea.   Endocrine:        Negative for galactorrhea   Neurological:  Positive for headaches (on occasion).   Psychiatric/Behavioral:  Negative for dysphoric mood and sleep disturbance. The patient is not nervous/anxious.        There were no vitals taken for this visit.  Wt Readings from Last 3 Encounters:   24 72.1 kg (159 lb)   24 71.7 kg (158 lb)   24 71.2 kg (157 lb)       Physical Exam  HENT:      Head: Normocephalic.   Neck:      Thyroid: No thyroid mass or thyromegaly.   Cardiovascular:      Rate and Rhythm: Normal rate.   Pulmonary:      Effort: No respiratory distress.      Breath sounds: Normal breath sounds.   Neurological:      Mental Status: She is alert.   Psychiatric:         Mood and Affect: Mood normal.         Behavior: Behavior normal.         Orders Placed This Encounter   Procedures    Prolactin     Standing Status:   Future     Standing Expiration Date:   10/10/2026       Current Outpatient Medications   Medication Sig Dispense Refill    cabergoline (DOSTINEX) 0.5 MG tablet Take 0.5 tablets by mouth once a week 6 tablet 3    Multiple Vitamin (MULTIVITAMIN ADULT PO) Take 1

## 2024-12-03 ENCOUNTER — OFFICE VISIT (OUTPATIENT)
Dept: FAMILY MEDICINE CLINIC | Facility: CLINIC | Age: 34
End: 2024-12-03
Payer: COMMERCIAL

## 2024-12-03 VITALS
DIASTOLIC BLOOD PRESSURE: 90 MMHG | OXYGEN SATURATION: 100 % | WEIGHT: 157.6 LBS | HEART RATE: 51 BPM | BODY MASS INDEX: 23.89 KG/M2 | HEIGHT: 68 IN | SYSTOLIC BLOOD PRESSURE: 166 MMHG

## 2024-12-03 DIAGNOSIS — R03.0 ELEVATED BLOOD PRESSURE READING: ICD-10-CM

## 2024-12-03 DIAGNOSIS — F41.1 GENERALIZED ANXIETY DISORDER: Primary | ICD-10-CM

## 2024-12-03 DIAGNOSIS — G44.209 MUSCLE TENSION HEADACHE: ICD-10-CM

## 2024-12-03 DIAGNOSIS — S16.1XXD STRAIN OF NECK MUSCLE, SUBSEQUENT ENCOUNTER: ICD-10-CM

## 2024-12-03 PROCEDURE — G8484 FLU IMMUNIZE NO ADMIN: HCPCS | Performed by: FAMILY MEDICINE

## 2024-12-03 PROCEDURE — G8427 DOCREV CUR MEDS BY ELIG CLIN: HCPCS | Performed by: FAMILY MEDICINE

## 2024-12-03 PROCEDURE — 99214 OFFICE O/P EST MOD 30 MIN: CPT | Performed by: FAMILY MEDICINE

## 2024-12-03 PROCEDURE — 1036F TOBACCO NON-USER: CPT | Performed by: FAMILY MEDICINE

## 2024-12-03 PROCEDURE — G8420 CALC BMI NORM PARAMETERS: HCPCS | Performed by: FAMILY MEDICINE

## 2024-12-03 ASSESSMENT — ENCOUNTER SYMPTOMS: RESPIRATORY NEGATIVE: 1

## 2024-12-03 ASSESSMENT — PATIENT HEALTH QUESTIONNAIRE - PHQ9
SUM OF ALL RESPONSES TO PHQ QUESTIONS 1-9: 0
SUM OF ALL RESPONSES TO PHQ9 QUESTIONS 1 & 2: 0
1. LITTLE INTEREST OR PLEASURE IN DOING THINGS: NOT AT ALL
SUM OF ALL RESPONSES TO PHQ QUESTIONS 1-9: 0
SUM OF ALL RESPONSES TO PHQ QUESTIONS 1-9: 0
2. FEELING DOWN, DEPRESSED OR HOPELESS: NOT AT ALL
SUM OF ALL RESPONSES TO PHQ QUESTIONS 1-9: 0

## 2024-12-03 NOTE — PROGRESS NOTES
PROGRESS NOTE    SUBJECTIVE:   Kelly Paris is a 34 y.o. female seen for a follow up visit regarding   Chief Complaint   Patient presents with    Annual Exam     Seeing chiropractor for headaches and neck pain x 1 year; reports not having a curve in her neck. Still having pain  Requesting referral for PT        HPI:  Very pleasant 34-year-old female comes in today reporting that she continues to have headaches that she believes may be related to chronic neck strain.  She has been going to the chiropractor but has been getting no relief in her neck issues.  She states the chiropractor told her she has no usual curvature in her cervical spine.  She has been not gotten any relief with multiple chiropractic visits.  She would like to try physical therapy.  She is continually having headaches that she describes as a bandlike pressure around her head that seems to originate from her neck, at times she may have a retrobulbar headache.  She endorses being anxious, is very upset today about her blood pressure and worried she is going to have a stroke.  She does not rest well, she has an 8-year-old son who gets her up frequently at night.         Reviewed and updated this visit by provider:           Review of Systems   Respiratory: Negative.     Cardiovascular: Negative.           OBJECTIVE:  Vitals:    12/03/24 0949   BP: (!) 166/90   Site: Right Upper Arm   Cuff Size: Small Adult   Pulse: 51   SpO2: 100%   Weight: 71.5 kg (157 lb 9.6 oz)   Height: 1.721 m (5' 7.76\")        Physical Exam   General: Alert and oriented x3, anxious, tearful at times  HEENT: Normocephalic; external ears, ear canals, and  Tympanic membranes normal; PERRLA, EOMI, normal conjunctiva; normal nasal mucosa without drainage; oropharynx is moist without mucosal lesion  Neck: No adenopathy, thyromegaly or thyroid nodules  Pulmonary: Normal effort, good airflow, no rales or rhonchi  CVS: Regular rate and rhythm, normal S1, S2, no S3 or S4, no

## 2024-12-05 DIAGNOSIS — M54.12 CERVICAL RADICULOPATHY: Primary | ICD-10-CM

## 2025-01-07 ENCOUNTER — OFFICE VISIT (OUTPATIENT)
Dept: ORTHOPEDIC SURGERY | Age: 35
End: 2025-01-07
Payer: COMMERCIAL

## 2025-01-07 VITALS — BODY MASS INDEX: 24.48 KG/M2 | HEIGHT: 67 IN | WEIGHT: 156 LBS

## 2025-01-07 DIAGNOSIS — M47.812 CERVICAL SPONDYLOSIS: Primary | ICD-10-CM

## 2025-01-07 PROCEDURE — 1036F TOBACCO NON-USER: CPT | Performed by: PHYSICIAN ASSISTANT

## 2025-01-07 PROCEDURE — M1308 PR FLU IMMUNIZE NO ADMIN: HCPCS | Performed by: PHYSICIAN ASSISTANT

## 2025-01-07 PROCEDURE — 99204 OFFICE O/P NEW MOD 45 MIN: CPT | Performed by: PHYSICIAN ASSISTANT

## 2025-01-07 PROCEDURE — G8420 CALC BMI NORM PARAMETERS: HCPCS | Performed by: PHYSICIAN ASSISTANT

## 2025-01-07 PROCEDURE — G8427 DOCREV CUR MEDS BY ELIG CLIN: HCPCS | Performed by: PHYSICIAN ASSISTANT

## 2025-01-07 RX ORDER — TIZANIDINE 2 MG/1
2 TABLET ORAL NIGHTLY
Qty: 30 TABLET | Refills: 3 | Status: SHIPPED | OUTPATIENT
Start: 2025-01-07

## 2025-01-07 NOTE — PROGRESS NOTES
Name: Kelly Paris  YOB: 1990  Gender: female  MRN: [id number]    CC:   Chief Complaint   Patient presents with    New Patient     Cervical spine. Pt states she has been in PT and has been told she may have had whiplash.Pt states she has dealt with headaches for the past year.         HPI:   Kelly Paris is a 34 y.o. female with a PMHx of an unknown autoimmune process currently under workup with rheumatology, chronic neck pain, other comorbidities below.         They present here for evaluation of primarily axial neck pain and neck pain in the trapezius distribution of her shoulders.  This been going on now for but sounds like a few years.  She does have headaches associated with it.  No balance disruption.  No numbness or tingling in her arms or legs.  She does have some occasional pain to her shoulders and upper arms at times but her primary issue sounds like neck pain and headaches.  She has been in physical therapy now for more than a month and is seen marginal improvement.  She is seeing a chiropractor 1-2 times per month since February 2024 and continues through today.  She tried over-the-counter analgesics without much meaningful improvement at this point.  She feels like her pain may have started and got worse after multiple trips to [place] where she is riding roller coasters and may have developed some whiplash over the last few years.  She does get temporary relief from chiropractic treatment, but it seems to wear off until she returns for another adjustment or session.  She has not been as active as she would like to because of her neck pain and headaches.  She has had no specific treatment for her headaches per se.      Pain Scale: Up to 8 out of 10  ADL's affected: Sleeping, exercise  Conservative treatment attempted: PT, chiropractor treatment, over-the-counter analgesics          Past Medical History Includes:   Past Medical History:   Diagnosis Date    Headache

## 2025-01-08 ENCOUNTER — PATIENT MESSAGE (OUTPATIENT)
Dept: ORTHOPEDIC SURGERY | Age: 35
End: 2025-01-08

## 2025-01-10 ENCOUNTER — TELEPHONE (OUTPATIENT)
Dept: ORTHOPEDIC SURGERY | Age: 35
End: 2025-01-10

## 2025-01-10 NOTE — TELEPHONE ENCOUNTER
Called pt - No answer - VM left.         Called pt to inform that appointment for 01/14/2025 will be cancelled at this time due to being denied with insurance. If denial is over turned then we will gove her a call back to reschedule appointment.     -LUDIVINA

## 2025-01-13 ENCOUNTER — TELEPHONE (OUTPATIENT)
Dept: ORTHOPEDIC SURGERY | Age: 35
End: 2025-01-13

## 2025-01-13 NOTE — TELEPHONE ENCOUNTER
Spoke with pt. Pt is going to complete 6 weeks of PT and contact our office back once she does. We will order all MRI at that time to see if they will be approved. Pt voiced understanding.

## 2025-01-13 NOTE — TELEPHONE ENCOUNTER
Called and spoke with pt. Pt was informed of the denial on her C spine MRI. Pt is doing PT at [facility] in [place]. When she has completed 6 weeks she will contact our office back so we can resubmit. At that time she would like to have a T and L spine MRI ordered. Pt voiced understanding.

## 2025-01-23 ENCOUNTER — PATIENT MESSAGE (OUTPATIENT)
Dept: ENDOCRINOLOGY | Age: 35
End: 2025-01-23

## 2025-01-23 DIAGNOSIS — D35.2 PROLACTINOMA (HCC): ICD-10-CM

## 2025-01-23 DIAGNOSIS — D35.2 PROLACTINOMA (HCC): Primary | ICD-10-CM

## 2025-01-23 LAB
CORTIS BS SERPL-MCNC: 6.4 UG/DL
PROLACTIN SERPL-MCNC: 92.1 NG/ML (ref 4.8–23.3)
T4 FREE SERPL-MCNC: 1.1 NG/DL (ref 0.9–1.7)
TSH, 3RD GENERATION: 1.73 UIU/ML (ref 0.27–4.2)

## 2025-01-24 ENCOUNTER — TELEPHONE (OUTPATIENT)
Dept: ORTHOPEDIC SURGERY | Age: 35
End: 2025-01-24

## 2025-01-24 RX ORDER — CABERGOLINE 0.5 MG/1
0.25 TABLET ORAL
Qty: 13 TABLET | Refills: 3 | Status: SHIPPED | OUTPATIENT
Start: 2025-01-27

## 2025-01-24 NOTE — TELEPHONE ENCOUNTER
Pt is now set up for her cervical  MRI  She  finished her  PT     She ask can she also have an MRI of her  back?    It may all be  coming  from the  neck   but after having  therapy   her  spine  now  hurts    Please  call her  to  advise

## 2025-01-24 NOTE — TELEPHONE ENCOUNTER
Before I can resubmit this request I need proof pt has completed the pt. This was recently denied on 01/06/2025 for not having 6 weeks of pt. I do not see any new notes/office visit stating that.       Thanks,    Loreto JIN

## 2025-01-27 ENCOUNTER — TELEPHONE (OUTPATIENT)
Dept: ORTHOPEDIC SURGERY | Age: 35
End: 2025-01-27

## 2025-01-27 NOTE — TELEPHONE ENCOUNTER
Called pt - No answer - VM left.       Called pt to inform that at this time the MRI that is scheduled for 01/30/2025 is being cancelled. Advised pt that once all the information is received for the MRI that the authorization can be started and once received authorization process will be started. If pt has any questions feel free to call the office at [phone].       -B

## 2025-01-30 ENCOUNTER — TELEPHONE (OUTPATIENT)
Dept: ORTHOPEDIC SURGERY | Age: 35
End: 2025-01-30

## 2025-01-30 NOTE — TELEPHONE ENCOUNTER
Called and spoke with pt. Pt has completed 6 weeks of PT and she is still having pain. Her pain now is worse then it was before. Pt was informed we will resubmit her claim for the MRI.

## 2025-01-31 ENCOUNTER — TELEPHONE (OUTPATIENT)
Dept: ORTHOPEDIC SURGERY | Age: 35
End: 2025-01-31

## 2025-01-31 NOTE — TELEPHONE ENCOUNTER
Called pt - No answer - Vm left      Called to reschedule appointment with pt. No answer vm left informing pt that approval was received and she can call the office back at [phone] and someone can get her rescheduled for this.       -BMB

## 2025-02-03 ENCOUNTER — TELEPHONE (OUTPATIENT)
Dept: RHEUMATOLOGY | Age: 35
End: 2025-02-03

## 2025-02-03 ENCOUNTER — OFFICE VISIT (OUTPATIENT)
Dept: FAMILY MEDICINE CLINIC | Facility: CLINIC | Age: 35
End: 2025-02-03
Payer: COMMERCIAL

## 2025-02-03 VITALS
DIASTOLIC BLOOD PRESSURE: 92 MMHG | WEIGHT: 152.8 LBS | HEIGHT: 68 IN | SYSTOLIC BLOOD PRESSURE: 144 MMHG | BODY MASS INDEX: 23.16 KG/M2 | OXYGEN SATURATION: 100 % | HEART RATE: 86 BPM

## 2025-02-03 DIAGNOSIS — R03.0 ELEVATED BLOOD PRESSURE READING IN OFFICE WITH WHITE COAT SYNDROME, WITHOUT DIAGNOSIS OF HYPERTENSION: ICD-10-CM

## 2025-02-03 DIAGNOSIS — M25.50 ARTHRALGIA, UNSPECIFIED JOINT: ICD-10-CM

## 2025-02-03 DIAGNOSIS — M79.10 MYALGIA: Primary | ICD-10-CM

## 2025-02-03 LAB
ALBUMIN SERPL-MCNC: 4.6 G/DL (ref 3.5–5)
ALBUMIN/GLOB SERPL: 1.9 (ref 1–1.9)
ALP SERPL-CCNC: 52 U/L (ref 35–104)
ALT SERPL-CCNC: 21 U/L (ref 8–45)
ANION GAP SERPL CALC-SCNC: 11 MMOL/L (ref 7–16)
AST SERPL-CCNC: 19 U/L (ref 15–37)
BASOPHILS # BLD: 0.06 K/UL (ref 0–0.2)
BASOPHILS NFR BLD: 0.8 % (ref 0–2)
BILIRUB SERPL-MCNC: 0.3 MG/DL (ref 0–1.2)
BUN SERPL-MCNC: 17 MG/DL (ref 6–23)
CALCIUM SERPL-MCNC: 9.7 MG/DL (ref 8.8–10.2)
CHLORIDE SERPL-SCNC: 102 MMOL/L (ref 98–107)
CK SERPL-CCNC: 102 U/L (ref 21–215)
CO2 SERPL-SCNC: 28 MMOL/L (ref 20–29)
CREAT SERPL-MCNC: 0.73 MG/DL (ref 0.6–1.1)
CRP SERPL-MCNC: <0.3 MG/DL (ref 0–0.4)
DIFFERENTIAL METHOD BLD: NORMAL
EOSINOPHIL # BLD: 0.05 K/UL (ref 0–0.8)
EOSINOPHIL NFR BLD: 0.7 % (ref 0.5–7.8)
ERYTHROCYTE [DISTWIDTH] IN BLOOD BY AUTOMATED COUNT: 13.5 % (ref 11.9–14.6)
ERYTHROCYTE [SEDIMENTATION RATE] IN BLOOD: <1 MM/HR (ref 0–20)
GLOBULIN SER CALC-MCNC: 2.4 G/DL (ref 2.3–3.5)
GLUCOSE SERPL-MCNC: 92 MG/DL (ref 70–99)
HCT VFR BLD AUTO: 40.4 % (ref 35.8–46.3)
HGB BLD-MCNC: 13.1 G/DL (ref 11.7–15.4)
IMM GRANULOCYTES # BLD AUTO: 0.02 K/UL (ref 0–0.5)
IMM GRANULOCYTES NFR BLD AUTO: 0.3 % (ref 0–5)
LYMPHOCYTES # BLD: 1.27 K/UL (ref 0.5–4.6)
LYMPHOCYTES NFR BLD: 16.6 % (ref 13–44)
MCH RBC QN AUTO: 29.2 PG (ref 26.1–32.9)
MCHC RBC AUTO-ENTMCNC: 32.4 G/DL (ref 31.4–35)
MCV RBC AUTO: 90 FL (ref 82–102)
MONOCYTES # BLD: 0.54 K/UL (ref 0.1–1.3)
MONOCYTES NFR BLD: 7.1 % (ref 4–12)
NEUTS SEG # BLD: 5.7 K/UL (ref 1.7–8.2)
NEUTS SEG NFR BLD: 74.5 % (ref 43–78)
NRBC # BLD: 0 K/UL (ref 0–0.2)
PLATELET # BLD AUTO: 306 K/UL (ref 150–450)
PMV BLD AUTO: 10.1 FL (ref 9.4–12.3)
POTASSIUM SERPL-SCNC: 4.4 MMOL/L (ref 3.5–5.1)
PROT SERPL-MCNC: 7 G/DL (ref 6.3–8.2)
RBC # BLD AUTO: 4.49 M/UL (ref 4.05–5.2)
SODIUM SERPL-SCNC: 141 MMOL/L (ref 136–145)
WBC # BLD AUTO: 7.6 K/UL (ref 4.3–11.1)

## 2025-02-03 PROCEDURE — 99214 OFFICE O/P EST MOD 30 MIN: CPT | Performed by: FAMILY MEDICINE

## 2025-02-03 PROCEDURE — G8427 DOCREV CUR MEDS BY ELIG CLIN: HCPCS | Performed by: FAMILY MEDICINE

## 2025-02-03 PROCEDURE — G8420 CALC BMI NORM PARAMETERS: HCPCS | Performed by: FAMILY MEDICINE

## 2025-02-03 PROCEDURE — 1036F TOBACCO NON-USER: CPT | Performed by: FAMILY MEDICINE

## 2025-02-03 RX ORDER — DULOXETIN HYDROCHLORIDE 20 MG/1
CAPSULE, DELAYED RELEASE ORAL
COMMUNITY
Start: 2025-01-27

## 2025-02-03 RX ORDER — GABAPENTIN 100 MG/1
CAPSULE ORAL
COMMUNITY
Start: 2025-01-27

## 2025-02-03 SDOH — ECONOMIC STABILITY: FOOD INSECURITY: WITHIN THE PAST 12 MONTHS, THE FOOD YOU BOUGHT JUST DIDN'T LAST AND YOU DIDN'T HAVE MONEY TO GET MORE.: NEVER TRUE

## 2025-02-03 SDOH — ECONOMIC STABILITY: FOOD INSECURITY: WITHIN THE PAST 12 MONTHS, YOU WORRIED THAT YOUR FOOD WOULD RUN OUT BEFORE YOU GOT MONEY TO BUY MORE.: NEVER TRUE

## 2025-02-03 ASSESSMENT — PATIENT HEALTH QUESTIONNAIRE - PHQ9
SUM OF ALL RESPONSES TO PHQ QUESTIONS 1-9: 2
SUM OF ALL RESPONSES TO PHQ QUESTIONS 1-9: 2
SUM OF ALL RESPONSES TO PHQ9 QUESTIONS 1 & 2: 2
2. FEELING DOWN, DEPRESSED OR HOPELESS: SEVERAL DAYS
SUM OF ALL RESPONSES TO PHQ QUESTIONS 1-9: 2
1. LITTLE INTEREST OR PLEASURE IN DOING THINGS: SEVERAL DAYS
SUM OF ALL RESPONSES TO PHQ QUESTIONS 1-9: 2

## 2025-02-03 ASSESSMENT — ENCOUNTER SYMPTOMS: RESPIRATORY NEGATIVE: 1

## 2025-02-03 NOTE — TELEPHONE ENCOUNTER
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 Next ov 08/05/25 and last ov was 05/17/24.    She left vm that she is having heaven sort of severe autoimmune flare and really needs to be seen? 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     Attempted to call patient. No answer. Left  for pt to call back.

## 2025-02-03 NOTE — PROGRESS NOTES
Here for follow-up, she continues to have musculoskeletal pain, neck, low back, knees.  She has been seen by orthopedics where there was significant concern over her cervical spine with degenerative changes beyond expectation for age.  They have been trying to get an MRI ordered.  Patient has been having some erratic blood pressures, she thinks is mostly tied to anxiety or recurrent musculoskeletal issues.  She was referred to [facility] where she was seen by a nurse practitioner who advised her to find a provider for counseling, however per patient arranges were not made within [facility] for ongoing counseling.PROGRESS NOTE    SUBJECTIVE:   Kelly Paris is a 35 y.o. female seen for a follow up visit regarding   Chief Complaint   Patient presents with    Back Pain     Complains of spine tenderness, neck pain   Reports going to PT which helped in the beginning; not so much now  Seen Ortho    Anxiety     Increased anxiety due to recent medical issues  Spoke with someone at [facility] but was recommended to find outside counselor         HPI:  Here for follow-up, she continues to have musculoskeletal pain, neck, low back, knees.  She has been seen by orthopedics where there was significant concern over her cervical spine with degenerative changes beyond expectation for age.  They have been trying to get an MRI ordered.  She is concerned she may have fibromyalgia although she relates most of her discomfort is over the spinous processes and paraspinal muscles, although she does have an apparent trigger in the left periscapular region.  Patient has been having some erratic blood pressures, she thinks is mostly tied to anxiety or recurrent musculoskeletal issues.  She was referred to [facility] where she was seen by a nurse practitioner who advised her to find a provider for counseling, however per patient arranges were not made within [facility] for ongoing counseling.         Reviewed and updated this visit by provider:

## 2025-02-04 ENCOUNTER — TELEPHONE (OUTPATIENT)
Dept: ORTHOPEDIC SURGERY | Age: 35
End: 2025-02-04

## 2025-02-04 LAB
ANA SER QL: NEGATIVE
CCP IGA+IGG SERPL IA-ACNC: 4 UNITS (ref 0–19)
RHEUMATOID FACT SER QL LA: NEGATIVE

## 2025-02-05 ENCOUNTER — TELEPHONE (OUTPATIENT)
Dept: ORTHOPEDIC SURGERY | Age: 35
End: 2025-02-05

## 2025-02-05 DIAGNOSIS — M50.30 DDD (DEGENERATIVE DISC DISEASE), CERVICAL: Primary | ICD-10-CM

## 2025-02-05 DIAGNOSIS — M48.02 FORAMINAL STENOSIS OF CERVICAL REGION: ICD-10-CM

## 2025-02-05 NOTE — TELEPHONE ENCOUNTER
Surrounding  soft tissues appear otherwise unremarkable.    Impression  1.  C4-5: Retrolisthesis and disc/osseous protrusion causes mild cord  effacement. Mild right and mild to moderate left foraminal stenosis.  2.  Spondylosis, mild kyphosis, and small noncompressive disc bulges.      Electronically signed by CRISTEL CASON

## 2025-02-12 ENCOUNTER — TELEPHONE (OUTPATIENT)
Dept: ORTHOPEDIC SURGERY | Age: 35
End: 2025-02-12

## 2025-02-12 NOTE — TELEPHONE ENCOUNTER
She is calling about the referrals she requested. You can call or just respond to her [employer] message. She would also like to know about the spine tenderness and possibly getting an MRI.

## 2025-02-20 ENCOUNTER — TELEPHONE (OUTPATIENT)
Dept: ORTHOPEDIC SURGERY | Age: 35
End: 2025-02-20

## 2025-02-20 NOTE — TELEPHONE ENCOUNTER
She is calling about the referrals she requested. Please call with an update. She is a little frustrated because she hasn't heard anything from anyone. She asked for pain management and [place].

## 2025-02-24 DIAGNOSIS — G89.29 CHRONIC NECK PAIN: Primary | ICD-10-CM

## 2025-02-24 DIAGNOSIS — M54.2 CHRONIC NECK PAIN: Primary | ICD-10-CM

## 2025-03-24 ENCOUNTER — TELEPHONE (OUTPATIENT)
Dept: ORTHOPEDIC SURGERY | Age: 35
End: 2025-03-24

## 2025-04-17 ENCOUNTER — PATIENT MESSAGE (OUTPATIENT)
Dept: FAMILY MEDICINE CLINIC | Facility: CLINIC | Age: 35
End: 2025-04-17

## 2025-04-17 DIAGNOSIS — M25.50 ARTHRALGIA, UNSPECIFIED JOINT: ICD-10-CM

## 2025-04-17 DIAGNOSIS — M79.10 MYALGIA: Primary | ICD-10-CM

## 2025-04-17 DIAGNOSIS — S16.1XXD STRAIN OF NECK MUSCLE, SUBSEQUENT ENCOUNTER: ICD-10-CM

## 2025-04-30 ENCOUNTER — TELEPHONE (OUTPATIENT)
Dept: FAMILY MEDICINE CLINIC | Facility: CLINIC | Age: 35
End: 2025-04-30

## 2025-04-30 NOTE — TELEPHONE ENCOUNTER
Referral along with insurance info, and last OV notes were printed and faxed over to Fax number provided, today 4/30/2025.

## 2025-04-30 NOTE — TELEPHONE ENCOUNTER
Estefany from [facility] called and stated that referral needs to be faxed to [phone] Dr. Uyen Diego

## 2025-05-30 DIAGNOSIS — D35.2 PROLACTINOMA (HCC): ICD-10-CM

## 2025-05-30 RX ORDER — CABERGOLINE 0.5 MG/1
0.25 TABLET ORAL
Qty: 13 TABLET | Refills: 3 | Status: SHIPPED | OUTPATIENT
Start: 2025-06-02

## 2025-05-30 RX ORDER — CABERGOLINE 0.5 MG/1
0.25 TABLET ORAL
Qty: 13 TABLET | Refills: 3 | Status: CANCELLED | OUTPATIENT
Start: 2025-06-02

## 2025-08-06 ENCOUNTER — OFFICE VISIT (OUTPATIENT)
Dept: RHEUMATOLOGY | Age: 35
End: 2025-08-06
Payer: COMMERCIAL

## 2025-08-06 VITALS
HEART RATE: 76 BPM | HEIGHT: 68 IN | DIASTOLIC BLOOD PRESSURE: 91 MMHG | SYSTOLIC BLOOD PRESSURE: 132 MMHG | WEIGHT: 164.6 LBS | BODY MASS INDEX: 24.95 KG/M2

## 2025-08-06 DIAGNOSIS — M50.30 BULGE OF CERVICAL DISC WITHOUT MYELOPATHY: ICD-10-CM

## 2025-08-06 DIAGNOSIS — R20.2 PARESTHESIA OF HAND, BILATERAL: ICD-10-CM

## 2025-08-06 DIAGNOSIS — R03.0 ELEVATED BLOOD PRESSURE READING: ICD-10-CM

## 2025-08-06 DIAGNOSIS — M25.50 ARTHRALGIA, UNSPECIFIED JOINT: Primary | ICD-10-CM

## 2025-08-06 PROBLEM — R76.8 POSITIVE ANA (ANTINUCLEAR ANTIBODY): Status: RESOLVED | Noted: 2022-04-05 | Resolved: 2025-08-06

## 2025-08-06 PROCEDURE — 99214 OFFICE O/P EST MOD 30 MIN: CPT | Performed by: INTERNAL MEDICINE

## 2025-08-06 PROCEDURE — G8419 CALC BMI OUT NRM PARAM NOF/U: HCPCS | Performed by: INTERNAL MEDICINE

## 2025-08-06 PROCEDURE — 1036F TOBACCO NON-USER: CPT | Performed by: INTERNAL MEDICINE

## 2025-08-06 PROCEDURE — G8427 DOCREV CUR MEDS BY ELIG CLIN: HCPCS | Performed by: INTERNAL MEDICINE

## 2025-08-06 RX ORDER — DULOXETIN HYDROCHLORIDE 60 MG/1
60 CAPSULE, DELAYED RELEASE ORAL DAILY
COMMUNITY

## 2025-08-06 ASSESSMENT — ROUTINE ASSESSMENT OF PATIENT INDEX DATA (RAPID3)
ON A SCALE OF ONE TO TEN, HOW MUCH PAIN HAVE YOU HAD BECAUSE OF YOUR CONDITION OVER THE PAST WEEK?: 4
ON A SCALE OF ONE TO TEN, CONSIDERING ALL THE WAYS IN WHICH ILLNESS AND HEALTH CONDITIONS MAY AFFECT YOU AT THIS TIME, PLEASE INDICATE BELOW HOW YOU ARE DOING:: 5
ON A SCALE OF ONE TO TEN, HOW DIFFICULT WAS IT FOR YOU TO COMPLETE THE LISTED DAILY PHYSICAL TASKS OVER THE LAST WEEK: 0.1
ON A SCALE OF ONE TO TEN, HOW MUCH OF A PROBLEM HAS UNUSUAL FATIGUE OR TIREDNESS BEEN FOR YOU OVER THE PAST WEEK?: 6
WHEN YOU AWAKENED IN THE MORNING OVER THE LAST WEEK, PLEASE INDICATE THE AMOUNT OF TIME IT TAKES UNTIL YOU ARE AS LIMBER AS YOU WILL BE FOR THE DAY: < 10 MIN

## 2025-08-19 ASSESSMENT — ENCOUNTER SYMPTOMS
DIARRHEA: 0
CONSTIPATION: 0

## 2025-08-20 ENCOUNTER — OFFICE VISIT (OUTPATIENT)
Dept: ENDOCRINOLOGY | Age: 35
End: 2025-08-20
Payer: COMMERCIAL

## 2025-08-20 VITALS
DIASTOLIC BLOOD PRESSURE: 82 MMHG | HEIGHT: 68 IN | SYSTOLIC BLOOD PRESSURE: 134 MMHG | BODY MASS INDEX: 24.71 KG/M2 | WEIGHT: 163 LBS | OXYGEN SATURATION: 98 % | HEART RATE: 68 BPM | RESPIRATION RATE: 16 BRPM

## 2025-08-20 DIAGNOSIS — M89.8X9 BONE PAIN: ICD-10-CM

## 2025-08-20 DIAGNOSIS — D35.2 PROLACTINOMA (HCC): Primary | ICD-10-CM

## 2025-08-20 PROCEDURE — G8427 DOCREV CUR MEDS BY ELIG CLIN: HCPCS | Performed by: INTERNAL MEDICINE

## 2025-08-20 PROCEDURE — G8420 CALC BMI NORM PARAMETERS: HCPCS | Performed by: INTERNAL MEDICINE

## 2025-08-20 PROCEDURE — 1036F TOBACCO NON-USER: CPT | Performed by: INTERNAL MEDICINE

## 2025-08-20 PROCEDURE — 99214 OFFICE O/P EST MOD 30 MIN: CPT | Performed by: INTERNAL MEDICINE

## 2025-08-20 RX ORDER — CABERGOLINE 0.5 MG/1
0.5 TABLET ORAL
Qty: 26 TABLET | Refills: 3 | Status: SHIPPED | OUTPATIENT
Start: 2025-08-21

## 2025-08-20 RX ORDER — MEDROXYPROGESTERONE ACETATE 10 MG
10 TABLET ORAL DAILY
COMMUNITY
Start: 2025-08-15